# Patient Record
Sex: FEMALE | Race: WHITE
[De-identification: names, ages, dates, MRNs, and addresses within clinical notes are randomized per-mention and may not be internally consistent; named-entity substitution may affect disease eponyms.]

---

## 2017-06-21 ENCOUNTER — HOSPITAL ENCOUNTER (EMERGENCY)
Dept: HOSPITAL 42 - ED | Age: 27
Discharge: HOME | End: 2017-06-21
Payer: MEDICAID

## 2017-06-21 VITALS — TEMPERATURE: 98.3 F

## 2017-06-21 VITALS — RESPIRATION RATE: 18 BRPM | OXYGEN SATURATION: 98 %

## 2017-06-21 VITALS — SYSTOLIC BLOOD PRESSURE: 102 MMHG | DIASTOLIC BLOOD PRESSURE: 61 MMHG | HEART RATE: 64 BPM
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
## 2017-06-21 DIAGNOSIS — S61.210A: Primary | ICD-10-CM

## 2017-06-21 DIAGNOSIS — Z23: ICD-10-CM
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
## 2017-06-21 NOTE — ED PDOC
Arrival/HPI





- General


Chief Complaint: Abnormal Skin Integrity


Time Seen by Provider: 06/21/17 11:13


Historian: Patient





- History of Present Illness


Narrative History of Present Illness (Text): 





06/21/17 11:13


This 25 yo female presents to this ED c/o right index finger laceration x 16 

hours.   Patient stated  appied a glue, and sterile strip to approximate 

wound last night.


Time/Duration: Other (see hpi)


Context: Home





Past Medical History





- Provider Review


Nursing Documentation Reviewed: Yes





- Psychiatric


Hx Psychophysiologic Disorder: No


Hx Substance Use: No





Family/Social History





- Physician Review


Nursing Documentation Reviewed: Yes


Family/Social History: No Known Family HX


Smoking Status: Never Smoked


Hx Alcohol Use: Yes


Frequency of alcohol use: Socially


Hx Substance Use: No





Allergies/Home Meds


Allergies/Adverse Reactions: 


Allergies





peach Allergy (Verified 06/21/17 10:22)


 RASH


Penicillins Allergy (Verified 06/21/17 10:22)


 SWELLING


pineapple Allergy (Verified 06/21/17 10:22)


 RASH


watermelon Allergy (Verified 06/21/17 10:22)


 RASH








Home Medications: 


 Home Meds











 Medication  Instructions  Recorded  Confirmed


 


No Known Home Med  06/21/17 06/21/17














Review of Systems





- Review of Systems


Constitutional: Normal.  absent: Fatigue, Weight Change, Fevers, Night Sweats


Eyes: Normal


ENT: Normal


Respiratory: Normal


Cardiovascular: Normal


Gastrointestinal: Normal


Genitourinary Female: Normal


Musculoskeletal: Other ((+) right index finger, laceration)


Skin: Normal


Neurological: Normal


Endocrine: Normal


Hemo/Lymphatic: Normal


Psychiatric: Normal





Physical Exam


Vital Signs











  Temp Pulse Resp BP Pulse Ox


 


 06/21/17 12:19   64  18  102/61  98


 


 06/21/17 11:33   65  18  100/59 L  98


 


 06/21/17 10:23  98.3 F  67  16  95/57 L  99











Temperature: Afebrile


Blood Pressure: Normal


Pulse: Regular


Respiratory Rate: Normal


Appearance: Positive for: Well-Appearing, Non-Toxic, Comfortable


Pain Distress: None


Mental Status: Positive for: Alert and Oriented X 3





- Systems Exam


Head: Present: Atraumatic, Normocephalic


Pupils: Present: PERRL


Extroacular Muscles: Present: EOMI


Conjunctiva: Present: Normal


Mouth: Present: Moist Mucous Membranes


Upper Extremity: Present: Normal ROM, NORMAL PULSES, Neurovascularly Intact, 

Capillary Refill < 2s, Other ((+) right index finger tip skin flap laceration.  

FROM).  No: Cyanosis, Edema, Tenderness, Swelling, Erythema


Lower Extremity: Present: Normal Inspection, NORMAL PULSES, Normal ROM


Neurological: Present: GCS=15, CN II-XII Intact, Speech Normal


Skin: Present: Warm, Dry, Normal Color, Laceration (see UE).  No: Rashes


Psychiatric: Present: Alert, Oriented x 3





Medical Decision Making


ED Course and Treatment: 





06/21/17 12:21


Re-evaluation.  Patient feels better.  Discussed results and plan with patient 

who expresses understanding.  All questions answered and there is agreement 

with the plan to discharge home with instructions.  Patient stable for 

discharge.  Return if symptoms persist or worsen.


Re-evaluation Time: 12:21


Reassessment Condition: Re-examined, Improved





- Medication Orders


Current Medication Orders: 











Discontinued Medications





Tetanus/Reduced Diphtheria/Acell Pertussis (Boostrix Vaccine Inj)  0.5 ml IM 

.ONCE ONE


   Stop: 06/21/17 11:14


   Last Admin: 06/21/17 11:32  Dose: 0.5 ml











- Procedure


PROCEDURE NOTE (Text): 





06/21/17 12:21


Unders terile technique, right 2nd finger tip superficial laceration was 

approximated with Dermabond.





Disposition/Present on Arrival





- Present on Arrival


Any Indicators Present on Arrival: No


History of DVT/PE: No


History of Uncontrolled Diabetes: No


Urinary Catheter: No


History of Decub. Ulcer: No


History Surgical Site Infection Following: None





- Disposition


Have Diagnosis and Disposition been Completed?: Yes


Diagnosis: 


 Finger laceration





Disposition: HOME/ ROUTINE


Disposition Time: 12:23


Patient Plan: Discharge


Condition: GOOD


Discharge Instructions (ExitCare):  Laceration (ED), Skin Adhesive Care (ED)


Additional Instructions: 


Call private doctor for follow up visit in 1-2 days.   take medication as 

instructed.   Return to emergency if symptoms worsen or wound becomes infected, 

drainage, or erythema.


Referrals: 


Dick Rockewll, [Primary Care Provider] - Follow up with primary


 Service [Outside] - Follow up with primary


[facility] [Outside] - Follow up with primary


Forms:  WORK NOTE

## 2018-10-29 ENCOUNTER — HOSPITAL ENCOUNTER (EMERGENCY)
Dept: HOSPITAL 42 - ED | Age: 28
Discharge: HOME | End: 2018-10-29
Payer: COMMERCIAL

## 2018-10-29 VITALS — SYSTOLIC BLOOD PRESSURE: 109 MMHG | DIASTOLIC BLOOD PRESSURE: 67 MMHG | OXYGEN SATURATION: 99 % | HEART RATE: 70 BPM

## 2018-10-29 VITALS — RESPIRATION RATE: 18 BRPM | TEMPERATURE: 98.7 F

## 2018-10-29 DIAGNOSIS — N39.0: ICD-10-CM

## 2018-10-29 DIAGNOSIS — N93.9: Primary | ICD-10-CM

## 2018-10-29 LAB
ALBUMIN SERPL-MCNC: 4.3 G/DL (ref 3–4.8)
ALBUMIN/GLOB SERPL: 1.1 {RATIO} (ref 1.1–1.8)
ALT SERPL-CCNC: 22 U/L (ref 7–56)
APPEARANCE UR: (no result)
AST SERPL-CCNC: 26 U/L (ref 14–36)
BACTERIA #/AREA URNS HPF: (no result) /[HPF]
BASOPHILS # BLD AUTO: 0.06 K/MM3 (ref 0–2)
BASOPHILS NFR BLD: 1 % (ref 0–3)
BILIRUB UR-MCNC: (no result) MG/DL
BUN SERPL-MCNC: 8 MG/DL (ref 7–21)
CALCIUM SERPL-MCNC: 9.2 MG/DL (ref 8.4–10.5)
COLOR UR: (no result)
EOSINOPHIL # BLD: 0.1 10*3/UL (ref 0–0.7)
EOSINOPHIL NFR BLD: 1.3 % (ref 1.5–5)
ERYTHROCYTE [DISTWIDTH] IN BLOOD BY AUTOMATED COUNT: 12.7 % (ref 11.5–14.5)
GFR NON-AFRICAN AMERICAN: > 60
GLUCOSE UR STRIP-MCNC: NEGATIVE MG/DL
GRANULOCYTES # BLD: 3.72 10*3/UL (ref 1.4–6.5)
GRANULOCYTES NFR BLD: 61.8 % (ref 50–68)
HGB BLD-MCNC: 12.2 G/DL (ref 12–16)
LEUKOCYTE ESTERASE UR-ACNC: (no result) LEU/UL
LYMPHOCYTES # BLD: 1.8 10*3/UL (ref 1.2–3.4)
LYMPHOCYTES NFR BLD AUTO: 29.9 % (ref 22–35)
MCH RBC QN AUTO: 30 PG (ref 25–35)
MCHC RBC AUTO-ENTMCNC: 33.4 G/DL (ref 31–37)
MCV RBC AUTO: 89.7 FL (ref 80–105)
MONOCYTES # BLD AUTO: 0.4 10*3/UL (ref 0.1–0.6)
MONOCYTES NFR BLD: 6 % (ref 1–6)
PH UR STRIP: 8.5 [PH] (ref 4.7–8)
PLATELET # BLD: 256 10^3/UL (ref 120–450)
PMV BLD AUTO: 9.6 FL (ref 7–11)
PROT UR STRIP-MCNC: >=300 MG/DL
RBC # BLD AUTO: 4.07 10^6/UL (ref 3.5–6.1)
RBC # UR STRIP: (no result) /UL
RBC #/AREA URNS HPF: (no result) /HPF (ref 0–2)
SP GR UR STRIP: 1.01 (ref 1–1.03)
UROBILINOGEN UR STRIP-ACNC: 1 E.U./DL
WBC # BLD AUTO: 6 10^3/UL (ref 4.5–11)

## 2018-10-29 NOTE — US
Date of service: 



10/29/2018



HISTORY:

Vaginal bleeding.  LMP approximately 1 week ago. 



COMPARISON:

None available.



TECHNIQUE:

Transvaginal only. Real -time technique with 2D, duplex and color 

Doppler



FINDINGS:



UTERUS:

Measures 4.6 x 6 x 8.9 cm. Normal in size and appearance. No fibroid 

or other mass lesion seen.



ENDOMETRIUM:

Measures 2 mm in diameter. Trace fluid in the endometrial canal. 



CERVIX:

No cervical abnormality identified.



RIGHT OVARY:

Measures 2.1 x 3.2 x 3.0 cm. No solid mass. Normal flow. 



LEFT OVARY:

Not visible. 



FREE FLUID:

No significant free fluid noted.



OTHER FINDINGS:

Trace fluid in the endometrial canal, otherwise unremarkable 

endometrium.  No uterine abnormalities detected.



Unremarkable right ovary.



Limitations of the current examination: Nonvisualization left adnexa. 



IMPRESSION:

Unremarkable pelvic ultrasound.

## 2018-10-29 NOTE — ED PDOC
Arrival/HPI





- General


Chief Complaint: Female Genitourinary


Time Seen by Provider: 10/29/18 15:00


Historian: Patient





- History of Present Illness


Narrative History of Present Illness (Text): 





10/29/18 16:53


28yr old female presents today with vaginal bleeding. Patient states she usually

has regular periods and 2 weeks ago she had a normal period but then again 

started bleeding today. Patient denies nausea vomiting diarrhea or constipation.

She denies pregnancy. She denies chest pain or shortness of breath. Patient 

states she's not sure why she is having a period 2 weeks after her previous 

menstruation.





Past Medical History





- Provider Review


Nursing Documentation Reviewed: Yes





- Travel History


Have you recently traveled outside [place] w/in the past 3 mons?: No





- Psychiatric


Hx Psychophysiologic Disorder: No


Hx Substance Use: No





- Anesthesia


Hx Anesthesia: Yes


Hx Anesthesia Reactions: No


Hx Malignant Hyperthermia: No





Family/Social History





- Physician Review


Nursing Documentation Reviewed: Yes


Family/Social History: Unknown Family HX


Smoking Status: Never Smoked


Hx Alcohol Use: Yes


Hx Substance Use: No





Allergies/Home Meds


Allergies/Adverse Reactions: 


Allergies





peach Allergy (Verified 06/21/17 10:22)


   RASH


Penicillins Allergy (Verified 06/21/17 10:22)


   SWELLING


pineapple Allergy (Verified 06/21/17 10:22)


   RASH


watermelon Allergy (Verified 06/21/17 10:22)


   RASH











Review of Systems





- Review of Systems


Constitutional: absent: Fatigue, Fevers


Respiratory: absent: SOB, Cough


Cardiovascular: absent: Chest Pain, Palpitations


Gastrointestinal: Abdominal Pain (minimal pelvic cramping).  absent: 

Constipation, Diarrhea, Nausea, Vomiting


Genitourinary Female: Vaginal Bleeding.  absent: Dysuria, Frequency, Hematuria


Musculoskeletal: absent: Arthralgias


Skin: absent: Rash, Pruritis


Neurological: absent: Headache, Dizziness


Psychiatric: absent: Anxiety, Depression





Physical Exam


Vital Signs Reviewed: Yes





Vital Signs











  Temp Pulse Resp BP Pulse Ox


 


 10/29/18 16:10   69  18  107/71  100


 


 10/29/18 13:40  98.7 F  75  18  105/62  100











Temperature: Afebrile


Blood Pressure: Normal


Pulse: Regular


Respiratory Rate: Normal


Appearance: Positive for: Well-Appearing, Non-Toxic, Comfortable


Pain Distress: None


Mental Status: Positive for: Alert and Oriented X 3





- Systems Exam


Head: Present: Atraumatic


Mouth: Present: Moist Mucous Membranes


Neck: Present: Normal Range of Motion


Respiratory/Chest: Present: Clear to Auscultation, Good Air Exchange.  No: 

Respiratory Distress, Accessory Muscle Use


Cardiovascular: Present: Regular Rate and Rhythm, Normal S1, S2.  No: Murmurs


Abdomen: No: Tenderness, Distention, Peritoneal Signs, Rebound, Guarding


Genitourinary/Pelvic Exam: Present: Normal External Genitalia, Vaginal Bleeding 

(minimal bleeding noted), Cervical os Closed, Other (chaparoned by Jaycee jarvis).

 No: Vaginal Discharge, Vaginal Lesions, Adenexal Tenderness, Adenexal Mass, 

Cervical Motion Tendernes


Upper Extremity: Present: Normal ROM


Lower Extremity: Present: Normal ROM


Neurological: Present: GCS=15, Speech Normal


Skin: Present: Warm, Dry, Normal Color


Psychiatric: Present: Alert, Oriented x 3





Medical Decision Making


ED Course and Treatment: 





10/29/18 17:07


Patient is nontoxic well appearing in no distress. vitals stable. 





CBC: WNL


CMP: WNL


Beta hCG: negative








Urinalysis:+ blood, + bacteria








Ultrasound:FINDINGS:


UTERUS:


Measures 4.6 x 6 x 8.9 cm. Normal in size and appearance. No fibroid or other 

mass lesion seen.


ENDOMETRIUM:


Measures 2 mm in diameter. Trace fluid in the endometrial canal. 


CERVIX:


No cervical abnormality identified.


RIGHT OVARY:


Measures 2.1 x 3.2 x 3.0 cm. No solid mass. Normal flow. 


LEFT OVARY:


Not visible. 


FREE FLUID:


No significant free fluid noted.


OTHER FINDINGS:


Trace fluid in the endometrial canal, otherwise unremarkable endometrium.  No 

uterine abnormalities detected.


Unremarkable right ovary.


Limitations of the current examination: Nonvisualization left adnexa. 


IMPRESSION:


Unremarkable pelvic ultrasound.





Discussed all the results the patient. 





will place patient on abx for possible uti. 





advised f/u with the gyn within the next 2 days. advised immediate return if 

symptoms worsen,persist or if new symptoms develop. 








Impression: Vaginal bleeding, uti


Increase fluids


macrobid; 1 tablet twice daily x 5 days. 


Follow-up with the primary care physician within the next 2 days


Follow-up with the gynecologist within the next 2 days


Return immediately if symptoms worsen persist or if new concerning symptoms 

develop: Dizziness, weakness, heavy bleeding, severe abdominal pain, nausea or 

vomiting or if any other concerning symptoms develop





- Lab Interpretations


Lab Results: 











                                 10/29/18 15:23 





                                 10/29/18 15:23 





                                   Lab Results





10/29/18 15:23: WBC 6.0, RBC 4.07, Hgb 12.2, Hct 36.5, MCV 89.7, MCH 30.0, MCHC 

33.4, RDW 12.7, Plt Count 256, MPV 9.6, Gran % 61.8, Lymph % (Auto) 29.9, Mono %

(Auto) 6.0, Eos % (Auto) 1.3 L, Baso % (Auto) 1.0, Gran # 3.72, Lymph # (Auto) 

1.8, Mono # (Auto) 0.4, Eos # (Auto) 0.1, Baso # (Auto) 0.06


10/29/18 15:23: Beta HCG, Quant < 2.39


10/29/18 15:23: Sodium 141, Potassium 4.1, Chloride 108 H, Carbon Dioxide 25, 

Anion Gap 12, BUN 8, Creatinine 0.7, Est GFR (African Amer) > 60, Est GFR (Non-

Af Amer) > 60, Random Glucose 86, Calcium 9.2, Total Bilirubin 0.4, AST 26, ALT 

22, Alkaline Phosphatase 66, Total Protein 8.1, Albumin 4.3, Globulin 3.8, 

Albumin/Globulin Ratio 1.1


10/29/18 15:23: Urine Color Red, Urine Appearance Bloody, Urine pH 8.5, Ur 

Specific Gravity 1.015, Urine Protein >=300 H, Urine Glucose (UA) Negative, 

Urine Ketones 15 H, Urine Blood Large H, Urine Nitrate Positive H, Urine 

Bilirubin Small H, Urine Urobilinogen 1.0 H, Ur Leukocyte Esterase Small H, 

Urine RBC 15 - 20, Urine WBC 1 - 3, Ur Epithelial Cells 10 - 12, Urine Bacteria 

Large











- RAD Interpretation


Radiology Orders: 











10/29/18 16:10


TRANSVAGINAL [US] Stat 














Disposition/Present on Arrival





- Present on Arrival


Any Indicators Present on Arrival: No


History of DVT/PE: No


History of Uncontrolled Diabetes: No


Urinary Catheter: No


History of Decub. Ulcer: No


History Surgical Site Infection Following: None





- Disposition


Have Diagnosis and Disposition been Completed?: Yes


Diagnosis: 


 Vaginal bleeding, UTI (urinary tract infection)





Disposition: HOME/ ROUTINE


Disposition Time: 16:30


Patient Plan: Discharge


Patient Problems: 


                             Current Active Problems











Problem Status Onset


 


Vaginal bleeding Acute 











Condition: GOOD


Additional Instructions: 


Increase fluids


macrobid; 1 tablet twice daily x 5 days. 


Follow-up with the primary care physician within the next 2 days


Follow-up with the gynecologist within the next 2 days


Return immediately if symptoms worsen persist or if new concerning symptoms 

develop: Dizziness, weakness, heavy bleeding, severe abdominal pain, nausea or 

vomiting or if any other concerning symptoms develop


Prescriptions: 


Nitrofurantoin Macrocrystals [Macrobid] 100 mg PO BID #10 cap


Referrals: 


PCP,NO [Primary Care Provider] - Follow up with primary


Joan Jacques MD [Medical Doctor] - Follow up with primary


Jorgito Rogers [Medical Doctor] - Follow up with primary


 Service [Outside] - Follow up with primary


[facility] [Outside] - Follow up with primary


Forms:  [employer] (English), WORK NOTE